# Patient Record
(demographics unavailable — no encounter records)

---

## 2025-05-06 NOTE — ADDENDUM
[FreeTextEntry1] : Entered by Rashawn Frazier, acting as scribe for Dr. Chip James. The documentation recorded by the scribe accurately reflects the service I personally performed and the decisions made by me.

## 2025-05-06 NOTE — LETTER BODY
[FreeTextEntry1] : Coleen Phillips MD [address] [place] [address] P: [phone] F: [phone]  Hai Olsen  [facility] [address] [place] [address] P: [phone] F: [phone]  Dear Dr. Phillips and Dr. Olsen,   Reason for Visit: BPH. Elevated PSA   This is a 78 year-old Mandarin-speaking gentleman with history of CAD and adenocarcinoma of left lung, presenting with elevated PSA and symptoms of BPH. Patient reports has had a previous negative prostate biopsy with Dr. Bryan Vaz. The patient underwent a prostate MRI in November 2020 which demonstrated an enlarged prostate gland measuring 116 cc with an intermediate right prostatic lesion, PI-RADS 3. His more recent prostate MRI from last year demonstrated PI-RADS 2 MRI with prostate volume measuring 95 cc. The patient returns today for follow up. Since he was last seen, he reports taking Flomax BID and Proscar regularly without any difficulties or side effects. Patient reports stable symptoms on medical therapy. He denies any hematuria or urinary incontinence. He reports no pain. All other review of systems are negative. Past medical history, family history and social history were inquired and were noncontributory to current condition. Medications and allergies were reviewed. He has no known allergies to medication.    On examination, the patient is an older-appearing gentleman in no acute distress. He is alert and oriented follows commands. He has normal mood and affect. He is normocephalic. Neck is supple. Oral no thrush Respirations are unlabored. His abdomen is soft and nontender. Bladder is nonpalpable. No CVA tenderness. Neurologically he is grossly intact. No peripheral edema. Skin without gross abnormality.     His recent BMP demonstrated normal renal functions, creatinine 1.07. His PSA was 4.40, which is appropriate.    ASSESSMENT: BPH. Elevated PSA.    I counseled the patient. In terms of his BPH, the patient reports stable urinary symptoms with medical therapy. I recommended he continue taking Flomax BID. In terms of his elevated PSA, his previous PSA was appropriate. The patient reports doing well on Proscar. I renewed the patient's prescription for Flomax and Proscar today. I encouraged the patient to continue medications regularly as directed. I also recommended he repeat PSA and BMP to ensure stability. Patient has a history of left lung cancer. I encouraged the patient to follow-up with Dr. Ziyad Tate for further evaluation. Risks and alternatives were discussed. I answered the patient questions. The patient will follow-up as directed and will contact me with any questions or concerns. Thank you for the opportunity to participate in the care of Mr. ZULETA. I will keep you updated on his progress.  Patient will continue longitudinal care for his complex and serious chronic condition.  Plan: Continue Flomax BID and Proscar. PSA. BMP. See Dr. Ziyad Tate. Follow up in 1 year.  I spent 30-minutes time today on all issues related to this patient on today date of service including non face to face time.

## 2025-07-20 NOTE — ASSESSMENT
[FreeTextEntry1] : Mr. HORACIO ZULETA, 78 year old male, former smoker, w/ hx of HLD and lung CA.   Now 7 yr 8mo s/p Lt VATS Robotic-assisted, wedge rxn of BLAISE lingual nodule, LLL wedge rxn, completion LLLobectomy, MLND on 11/13/17. Path revealed LLL AdenoCA, papillary type, 1.7 x 1.5 x 1.2 cm, visceral pleura, margins and LNs negative, [id number] Stg IA.  CT chest on 7/11/25 at [facility]: - post-op changes - mild centrilobular emphysema - APPLE  I have reviewed the patient's medical records and diagnostic images at time of this office consultation and have made the following recommendation: 1. CT scan showed no evidence of recurrence from thoracic standpoint, recommended patient to return to office in 12 months with CT Chest w/out contrast. Call back sooner if any concerns.  2. F/u with Dr. Luther for evaluation of possible of hemoptysis 3. F/u with Hematology for abnormal blood work.  I, TABITHA Richardson, personally performed the evaluation and management (E/M) services for this established patient who follow up today with an existing condition.  That E/M includes conducting the examination, assessing all new/exacerbated/existing conditions, and establishing a plan of care.  Today, my ACP, Clarisa Morton, ANP-C, was here to observe my evaluation and management services for this existing condition to be followed going forward.

## 2025-07-20 NOTE — REASON FOR VISIT
[Follow-Up: _____] : a [unfilled] follow-up visit [Telephone (audio)] : This telephonic visit was provided via audio only technology. [Technical] : patient unable to effectively utilize tele-video due to technical issues.

## 2025-07-20 NOTE — ASSESSMENT
[FreeTextEntry1] : Mr. HORACIO ZULETA, 78 year old male, former smoker, w/ hx of HLD and lung CA.   Now 7 yr 8mo s/p Lt VATS Robotic-assisted, wedge rxn of BLAISE lingual nodule, LLL wedge rxn, completion LLLobectomy, MLND on 11/13/17. Path revealed LLL AdenoCA, papillary type, 1.7 x 1.5 x 1.2 cm, visceral pleura, margins and LNs negative, [id number] Stg IA.  CT chest on 7/11/25 at [facility]: - post-op changes - mild centrilobular emphysema - APPLE  I have reviewed the patient's medical records and diagnostic images at time of this office consultation and have made the following recommendation: 1. CT scan showed no evidence of recurrence from thoracic standpoint, recommended patient to return to office in 12 months with CT Chest w/out contrast. Call back sooner if any concerns.  2. F/u with Dr. Luther for evaluation of possible of hemoptysis 3. F/u with Hematology for abnormal blood work.  I, TABITHA Richardson, personally performed the evaluation and management (E/M) services for this established patient who follow up today with an existing condition.  That E/M includes conducting the examination, assessing all new/exacerbated/existing conditions, and establishing a plan of care.  Today, my ACP, Clarisa Morton, ANP-C, was here to observe my evaluation and management services for this existing condition to be followed going forward.

## 2025-07-20 NOTE — CONSULT LETTER
[Dear  ___] : Dear  [unfilled], [Consult Letter:] : I had the pleasure of evaluating your patient, [unfilled]. [( Thank you for referring [unfilled] for consultation for _____ )] : Thank you for referring [unfilled] for consultation for [unfilled] [Please see my note below.] : Please see my note below. [Consult Closing:] : Thank you very much for allowing me to participate in the care of this patient.  If you have any questions, please do not hesitate to contact me. [Sincerely,] : Sincerely, [DrRosette  ___] : Dr. GALEANA [DrRosette ___] : Dr. GALEANA [FreeTextEntry2] : Coleen Phillips MD (PCP/Referring)\steven Olsen MD (cardiologist)\steven Jameson MD (pulmonologist)  [FreeTextEntry3] : Ziyad Tate MD, MPH \par  System Director of Thoracic Surgery \par  Director of Comprehensive Lung and Foregut [place] \par  Professor Cardiovascular & Thoracic Surgery  \par  [facility] [employer] at [facility]\par

## 2025-07-20 NOTE — ASSESSMENT
[FreeTextEntry1] : Mr. HORACIO ZULETA, 78 year old male, former smoker, w/ hx of HLD and lung CA.   Now 7 yr 8mo s/p Lt VATS Robotic-assisted, wedge rxn of BLAISE lingual nodule, LLL wedge rxn, completion LLLobectomy, MLND on 11/13/17. Path revealed LLL AdenoCA, papillary type, 1.7 x 1.5 x 1.2 cm, visceral pleura, margins and LNs negative, [id number] Stg IA.  CT chest on 7/11/25 at [facility]: - post-op changes - mild centrilobular emphysema - APPLE  I have reviewed the patient's medical records and diagnostic images at time of this office consultation and have made the following recommendation: 1. CT scan showed no evidence of recurrence from thoracic standpoint, recommended patient to return to office in 12 months with CT Chest w/out contrast. Call back sooner if any concerns.  2. F/u with Dr. Luther for evaluation of possible of hemoptysis 3. F/u with Hematology for abnormal blood work.  I, TABITHA Richardson, personally performed the evaluation and management (E/M) services for this established patient who follow up today with an existing condition.  That E/M includes conducting the examination, assessing all new/exacerbated/existing conditions, and establishing a plan of care.  Today, my ACP, Clarisa Morton, ANP-C, was here to observe my evaluation and management services for this existing condition to be followed going forward.

## 2025-07-20 NOTE — HISTORY OF PRESENT ILLNESS
[FreeTextEntry1] : Mr. HORACIO ZULETA, 78 year old male, former smoker, w/ hx of HLD and lung CA.   Now 7 yr 8mo s/p Lt VATS Robotic-assisted, wedge rxn of BLAISE lingual nodule, LLL wedge rxn, completion LLLobectomy, MLND on 11/13/17. Path revealed LLL AdenoCA, papillary type, 1.7 x 1.5 x 1.2 cm, visceral pleura, margins and LNs negative, [id number] Stg IA.  Patient lost f/u since May 2019.  Of note, patient was hospitalized in April 2020 for COVID-19 PNA complicated by P.E. (treated).  CT chest on 07/10/2023:  - Stable post-operative changes following prior left lower lobectomy without CT evidence of recurrent or metastatic spread of disease within the thorax.  CT chest on 7/11/24 at [facility]: - Mild emphysema. Stable postoperative changes in the left lung status post left lower lobectomy.  - Multifocal areas of mild subsegmental atelectasis/pulmonary scarring noted bilaterally.  - Small calcified granuloma noted. - No evidence of pleural effusion or pleural-based mass.  CT chest on 7/11/25 at [facility]: - post-op changes - mild centrilobular emphysema - APPLE  Patient is followed today via Telephonic visit. Patient c/o cough with blood-tinged sputum for 2 days a few months ago. Patient stated he thinks it from nosebleed. No recurrent. Patient is referred to Hematology (cannot recall the nama of the doctor) for elevated blood tumor marker? Pending appointment with Dr. Jameson.

## 2025-07-20 NOTE — HISTORY OF PRESENT ILLNESS
[FreeTextEntry1] : Mr. HORACIO ZULETA, 78 year old male, former smoker, w/ hx of HLD and lung CA.   Now 7 yr 8mo s/p Lt VATS Robotic-assisted, wedge rxn of BLAISE lingual nodule, LLL wedge rxn, completion LLLobectomy, MLND on 11/13/17. Path revealed LLL AdenoCA, papillary type, 1.7 x 1.5 x 1.2 cm, visceral pleura, margins and LNs negative, [id number] Stg IA.  Patient lost f/u since May 2019.  Of note, patient was hospitalized in April 2020 for COVID-19 PNA complicated by P.E. (treated).  CT chest on 07/10/2023:  - Stable post-operative changes following prior left lower lobectomy without CT evidence of recurrent or metastatic spread of disease within the thorax.  CT chest on 7/11/24 at [facility]: - Mild emphysema. Stable postoperative changes in the left lung status post left lower lobectomy.  - Multifocal areas of mild subsegmental atelectasis/pulmonary scarring noted bilaterally.  - Small calcified granuloma noted. - No evidence of pleural effusion or pleural-based mass.  CT chest on 7/11/25 at [facility]: - post-op changes - mild centrilobular emphysema - APPLE  Patient is followed today via Telephonic visit. Patient c/o cough with blood-tinged sputum for 2 days a few months ago. Patient stated he thinks it from nosebleed. No recurrent. Patient is referred to Hematology (cannot recall the nama of the doctor) for elevated blood tumor marker? Pending appointment with Dr. Jameson.

## 2025-07-20 NOTE — CONSULT LETTER
[Dear  ___] : Dear  [unfilled], [Consult Letter:] : I had the pleasure of evaluating your patient, [unfilled]. [( Thank you for referring [unfilled] for consultation for _____ )] : Thank you for referring [unfilled] for consultation for [unfilled] [Please see my note below.] : Please see my note below. [Consult Closing:] : Thank you very much for allowing me to participate in the care of this patient.  If you have any questions, please do not hesitate to contact me. [Sincerely,] : Sincerely, [DrRosette  ___] : Dr. GALEANA [DrRosette ___] : Dr. GALEANA [FreeTextEntry2] : Coleen Phillips MD (PCP/Referring)\steven Olsen MD (cardiologist)\steven Jameson MD (pulmonologist)  [FreeTextEntry3] : Ziyad Tate MD, MPH \par  System Director of Thoracic Surgery \par  Director of Comprehensive Lung and Foregut [place] \par  Professor Cardiovascular & Thoracic Surgery  \par  [facility] [employer] at [facility]\par

## 2025-07-20 NOTE — CONSULT LETTER
[Dear  ___] : Dear  [unfilled], [Consult Letter:] : I had the pleasure of evaluating your patient, [unfilled]. [( Thank you for referring [unfilled] for consultation for _____ )] : Thank you for referring [unfilled] for consultation for [unfilled] [Please see my note below.] : Please see my note below. [Consult Closing:] : Thank you very much for allowing me to participate in the care of this patient.  If you have any questions, please do not hesitate to contact me. [Sincerely,] : Sincerely, [DrRosette  ___] : Dr. GALEANA [DrRosette ___] : Dr. GALEANA [FreeTextEntry2] : Coleen Phillips MD (PCP/Referring)\steven Olsen MD (cardiologist)\steven Jameson MD (pulmonologist)  [FreeTextEntry3] : Ziyad Tate MD, MPH \par  System Director of Thoracic Surgery \par  Director of Comprehensive Lung and Foregut [place] \par  Professor Cardiovascular & Thoracic Surgery  \par  [facility] [employer] at [facility]\par